# Patient Record
(demographics unavailable — no encounter records)

---

## 2024-12-07 NOTE — ASSESSMENT
[FreeTextEntry1] : Pt. has Impaired fasting glucose and current HbA1c is at 5.8 => 5.7. We have discussed diet/exercise and risks related to diabetes in great detail. The patient has a history of goiter and though currently is clinically euthyroid with no hypothyroid or hyperthyroid signs or symptoms. The patient's  current TSH,  free T4 and free T3 are in the normal range. Risks of subclinical hyperthyroidism (BMD, cardio etc.) and hypothyroidism ( fatigue , muscle aches, weight gain etc.)  discussed in detail and given clinical euthyroid state after discussion pt. is comfortable with observation. Lipids were adequate LDL/HDL was 131/57 from 132/55, 123/64, 128/56 & 135/56. Trig. were 86 => 64 Pt. has had a low Vit D with previous 36 from  50. The importance of normal value and need for Vit D supplements of 1000 IU daily was discussed. Pt with previous high BP so  added  losartan 25.- now /78 and stopped losartan  Pt c/o temple tenderness so checked  ESR. which was normal at 14.

## 2025-06-28 NOTE — ASSESSMENT
[FreeTextEntry1] : Pt. has Impaired fasting glucose and current HbA1c is at 5.8 => 5.7-> 6.1. We have discussed diet/exercise and risks related to diabetes in great detail. The patient has a history of goiter and though currently is clinically euthyroid with no hypothyroid or hyperthyroid signs or symptoms. The patient's current TSH, free T4 and free T3 are in the normal range. Risks of subclinical hyperthyroidism (BMD, cardio etc.) and hypothyroidism (fatigue, muscle aches, weight gain etc.)  discussed in detail and given clinical euthyroid state after discussion pt. is comfortable with observation. Lipids were adequate LDL/HDL was 131/57=> 119/56 Trig. were 86 => 64 Pt. has had a low Vit D with 50-> 36 -> [id number]. The importance of normal value and need for Vit D supplements of 1000 IU daily was discussed. Pt with previous high BP so added  losartan 25.- >  stopped losartan  Pt c/o temple tenderness so checked ESR. which was normal at 14.